# Patient Record
Sex: FEMALE | Race: WHITE | NOT HISPANIC OR LATINO | Employment: STUDENT | URBAN - METROPOLITAN AREA
[De-identification: names, ages, dates, MRNs, and addresses within clinical notes are randomized per-mention and may not be internally consistent; named-entity substitution may affect disease eponyms.]

---
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
## 2022-01-26 ENCOUNTER — VIRTUAL VISIT (OUTPATIENT)
Dept: PEDIATRICS | Facility: CLINIC | Age: 22
End: 2022-01-26
Payer: COMMERCIAL

## 2022-01-26 DIAGNOSIS — N94.6 DYSMENORRHEA: ICD-10-CM

## 2022-01-26 DIAGNOSIS — R48.0 DYSLEXIA: ICD-10-CM

## 2022-01-26 DIAGNOSIS — F90.0 ADHD, PREDOMINANTLY INATTENTIVE TYPE: Primary | ICD-10-CM

## 2022-01-26 PROBLEM — F43.23 ADJUSTMENT DISORDER WITH MIXED ANXIETY AND DEPRESSED MOOD: Status: RESOLVED | Noted: 2018-07-31 | Resolved: 2022-01-26

## 2022-01-26 PROCEDURE — 99214 OFFICE O/P EST MOD 30 MIN: CPT | Mod: TEL | Performed by: SPECIALIST

## 2022-01-26 RX ORDER — DEXTROAMPHETAMINE/AMPHETAMINE 15 MG
15 CAPSULE, EXT RELEASE 24 HR ORAL DAILY
Qty: 30 CAPSULE | Refills: 0 | Status: SHIPPED | OUTPATIENT
Start: 2022-02-26 | End: 2022-03-28

## 2022-01-26 RX ORDER — NORGESTIMATE AND ETHINYL ESTRADIOL 0.25-0.035
KIT ORAL
Qty: 84 TABLET | Refills: 3 | Status: SHIPPED | OUTPATIENT
Start: 2022-01-26 | End: 2022-12-16

## 2022-01-26 RX ORDER — DEXTROAMPHETAMINE/AMPHETAMINE 15 MG
15 CAPSULE, EXT RELEASE 24 HR ORAL DAILY
Qty: 30 CAPSULE | Refills: 0 | Status: SHIPPED | OUTPATIENT
Start: 2022-01-26 | End: 2022-02-25

## 2022-01-26 RX ORDER — DEXTROAMPHETAMINE/AMPHETAMINE 15 MG
15 CAPSULE, EXT RELEASE 24 HR ORAL DAILY
Qty: 30 CAPSULE | Refills: 0 | Status: SHIPPED | OUTPATIENT
Start: 2022-03-29 | End: 2022-04-28

## 2022-01-26 NOTE — PROGRESS NOTES
Ami is a 21 year old who is being evaluated via a billable telephone visit.      What phone number would you like to be contacted at? [phone]  How would you like to obtain your AVS? MyChart- will activate    Assessment & Plan   1. ADHD, predominantly inattentive type  Adderall is helping but sounds like more room for improvement.   Will increase to 15 mg.   If trouble with appetite or sleep to let us know.   Sent in 3 mos of scripts so when you run out of medication, contact pharmacy.  - ADDERALL XR 15 MG 24 hr capsule; Take 1 capsule (15 mg) by mouth daily Generic or brand name- which ever is covered  Dispense: 30 capsule; Refill: 0  - ADDERALL XR 15 MG 24 hr capsule; Take 1 capsule (15 mg) by mouth daily Generic or brand name- which ever is covered  Dispense: 30 capsule; Refill: 0  - ADDERALL XR 15 MG 24 hr capsule; Take 1 capsule (15 mg) by mouth daily Generic or brand name- which ever is covered  Dispense: 30 capsule; Refill: 0    2. Dyslexia    3. Dysmenorrhea  Doing well. If becomes sexually active then should have STD screening once yearly,   - norgestimate-ethinyl estradiol (ORTHO-CYCLEN) 0.25-35 MG-MCG tablet; Take by mouth active pills only as directed  Dispense: 84 tablet; Refill: 3- had refills here but sent to [place] instead.                      Return in about 6 months (around 7/26/2022) for Check up/ Well visit, ADHD, med recheck.    Laurie Guerrero MD  [facility]    Juan Mueller is a 21 year old who presents for the following health issues    HPI           Medication Followup of  Adderall and Birth Control pills  ADD recheck:     Taking Medication as prescribed: yes- only school days. Sees definite improvement- 30-40% improvement in symptoms. Open to going up on dosing. Lasts about 7- 8 hrs.     History of ADD but never took meds when younger. Had more difficulty in college and started Adderall 10/20.     Started College at [employer] very strict and not doing in  person- switched to [place] last year. In person school. Science major. Doing ok but school is hard.     Last office visit 11/24/21 Dr. Laura Vazquez- told to f/u 6 mos- thought she needed call today to get any refills.     Side Effects:  None; if takes in afternoon then fine.     Medication Helping Symptoms:  yes    PDMP Review       Value Time User    State PDMP site checked  Yes 11/24/2021  4:34 PM Laura Vazquez, April MD Hanna       Last filled 11/24/21     OCP- Ortho- cyclen  Has been on OCP since 6/18 for dysmenorrhea  Not sexually active but now has boyfriend. Was given estrogen cream to use by gyn. Nervous about pain.            Objective           Vitals:  No vitals were obtained today due to virtual visit.    Physical Exam   alert and no distress  PSYCH: Alert and oriented times 3; coherent speech, normal   rate and volume, able to articulate logical thoughts, able   to abstract reason, no tangential thoughts, no hallucinations   or delusions  Her affect is normal  Remainder of exam unable to be completed due to telephone visits              Phone call duration: 12  minutes

## 2022-01-26 NOTE — PATIENT INSTRUCTIONS
Will try increase in Adderall XR to 15 mg. If trouble with appetite or sleep to let us know.   Sent in 3 mos of scripts so when you run out of medication, contact pharmacy.    Refilled birth control pills.     Recommend recheck in 6 mos when home this summer- general check up, med follow up

## 2022-08-08 ENCOUNTER — OFFICE VISIT (OUTPATIENT)
Dept: INTERNAL MEDICINE | Facility: CLINIC | Age: 22
End: 2022-08-08
Payer: COMMERCIAL

## 2022-08-08 VITALS
WEIGHT: 128 LBS | SYSTOLIC BLOOD PRESSURE: 90 MMHG | HEART RATE: 104 BPM | RESPIRATION RATE: 16 BRPM | TEMPERATURE: 97.4 F | BODY MASS INDEX: 21.33 KG/M2 | DIASTOLIC BLOOD PRESSURE: 60 MMHG | OXYGEN SATURATION: 98 % | HEIGHT: 65 IN

## 2022-08-08 DIAGNOSIS — H93.93 EAR PROBLEM, BILATERAL: Primary | ICD-10-CM

## 2022-08-08 DIAGNOSIS — Z23 ENCOUNTER FOR ADMINISTRATION OF VACCINE: ICD-10-CM

## 2022-08-08 PROCEDURE — 99212 OFFICE O/P EST SF 10 MIN: CPT

## 2022-08-08 PROCEDURE — 0054A COVID-19,PF,PFIZER (12+ YRS): CPT

## 2022-08-08 PROCEDURE — 91305 COVID-19,PF,PFIZER (12+ YRS): CPT

## 2022-08-08 ASSESSMENT — ENCOUNTER SYMPTOMS
SORE THROAT: 0
FEVER: 0

## 2022-08-08 NOTE — PROGRESS NOTES
"  Assessment & Plan     (H93.93) Ear problem, bilateral  (primary encounter diagnosis)  Comment: Pt's mom was worried she has earwax buildup. Pt denies any ear pain or any changes in hearing. There is no wax buildup present upon exam. B/L ear exam normal.       (Z23) Encounter for administration of vaccine  Plan: COVID-19,PF,PFIZER (12+ Yrs GRAY LABEL)            10 minutes spent on the date of the encounter doing chart review, history and exam, documentation and further activities per the note  {Provider  Link to [facility] Help Grid :[id number]}       Return in about 1 month (around 9/8/2022) for Routine preventive.    Sangeeta Correia, BRIDGER CNP  [facility] Mueller is a 22 year old, presenting for the following health issues:  Ear Problem      HPI     Review of Systems   Constitutional: Negative for fever.   HENT: Negative for ear discharge, ear pain, hearing loss and sore throat.         Objective    BP 90/60   Pulse 104   Temp 97.4  F (36.3  C) (Oral)   Resp 16   Ht 1.651 m (5' 5\")   Wt 58.1 kg (128 lb)   LMP 07/13/2022 (Exact Date)   SpO2 98%   Breastfeeding No   BMI 21.30 kg/m    Body mass index is 21.3 kg/m .  Physical Exam  Constitutional:       General: She is not in acute distress.     Appearance: Normal appearance. She is not ill-appearing, toxic-appearing or diaphoretic.   HENT:      Head: Normocephalic and atraumatic.      Right Ear: Tympanic membrane, ear canal and external ear normal. There is no impacted cerumen.      Left Ear: Tympanic membrane, ear canal and external ear normal. There is no impacted cerumen.      Mouth/Throat:      Mouth: Mucous membranes are moist.      Pharynx: Oropharynx is clear. No oropharyngeal exudate or posterior oropharyngeal erythema.   Eyes:      Conjunctiva/sclera: Conjunctivae normal.   Cardiovascular:      Rate and Rhythm: Normal rate and regular rhythm.      Heart sounds: Normal heart sounds.   Pulmonary:      Effort: Pulmonary " effort is normal.      Breath sounds: Normal breath sounds.   Neurological:      Mental Status: She is alert.                .  ..

## 2022-11-14 ENCOUNTER — MYC REFILL (OUTPATIENT)
Dept: FAMILY MEDICINE | Facility: CLINIC | Age: 22
End: 2022-11-14

## 2022-11-14 DIAGNOSIS — F90.9 ATTENTION DEFICIT HYPERACTIVITY DISORDER (ADHD), UNSPECIFIED ADHD TYPE: ICD-10-CM

## 2022-11-15 RX ORDER — DEXTROAMPHETAMINE SACCHARATE, AMPHETAMINE ASPARTATE MONOHYDRATE, DEXTROAMPHETAMINE SULFATE AND AMPHETAMINE SULFATE 2.5; 2.5; 2.5; 2.5 MG/1; MG/1; MG/1; MG/1
10 CAPSULE, EXTENDED RELEASE ORAL DAILY
Qty: 30 CAPSULE | Refills: 0 | OUTPATIENT
Start: 2022-11-15

## 2022-11-15 NOTE — TELEPHONE ENCOUNTER
PDMP Review       Value Time User    State PDMP site checked  Yes 11/15/2022 10:40 AM Laurie Fulton MD          No refills on PDMP site.     1/26/22 LOV-      Return in about 6 months (around 7/26/2022) for Check up/ Well visit, ADHD, med recheck.    Declined refill. Needs appt.

## 2022-12-16 ENCOUNTER — VIRTUAL VISIT (OUTPATIENT)
Dept: FAMILY MEDICINE | Facility: CLINIC | Age: 22
End: 2022-12-16
Payer: COMMERCIAL

## 2022-12-16 DIAGNOSIS — F90.9 ATTENTION DEFICIT HYPERACTIVITY DISORDER (ADHD), UNSPECIFIED ADHD TYPE: ICD-10-CM

## 2022-12-16 DIAGNOSIS — N94.6 DYSMENORRHEA: ICD-10-CM

## 2022-12-16 PROCEDURE — 99214 OFFICE O/P EST MOD 30 MIN: CPT | Mod: TEL | Performed by: FAMILY MEDICINE

## 2022-12-16 RX ORDER — NORGESTIMATE AND ETHINYL ESTRADIOL 0.25-0.035
KIT ORAL
Qty: 112 TABLET | OUTPATIENT
Start: 2022-12-16

## 2022-12-16 RX ORDER — DEXTROAMPHETAMINE/AMPHETAMINE 15 MG
CAPSULE, EXT RELEASE 24 HR ORAL
COMMUNITY
Start: 2022-08-28 | End: 2022-12-16

## 2022-12-16 RX ORDER — NORGESTIMATE AND ETHINYL ESTRADIOL 0.25-0.035
KIT ORAL
Qty: 84 TABLET | Refills: 3 | Status: SHIPPED | OUTPATIENT
Start: 2022-12-16 | End: 2023-08-16

## 2022-12-16 RX ORDER — DEXTROAMPHETAMINE/AMPHETAMINE 15 MG
15 CAPSULE, EXT RELEASE 24 HR ORAL EVERY MORNING
Qty: 30 CAPSULE | Refills: 0 | Status: SHIPPED | OUTPATIENT
Start: 2022-12-16 | End: 2024-04-17

## 2022-12-16 RX ORDER — DEXTROAMPHETAMINE SACCHARATE, AMPHETAMINE ASPARTATE MONOHYDRATE, DEXTROAMPHETAMINE SULFATE AND AMPHETAMINE SULFATE 2.5; 2.5; 2.5; 2.5 MG/1; MG/1; MG/1; MG/1
10 CAPSULE, EXTENDED RELEASE ORAL DAILY
Qty: 30 CAPSULE | Refills: 0 | Status: SHIPPED | OUTPATIENT
Start: 2022-12-16 | End: 2022-12-16 | Stop reason: DRUGHIGH

## 2022-12-16 NOTE — TELEPHONE ENCOUNTER
Duplicate request. Prescription refilled today. Refusing refill request and closing encounter.     Frieda Crowell RN on 12/16/2022 at 10:39 AM

## 2022-12-16 NOTE — PROGRESS NOTES
Ami is a 22 year old who is being evaluated via a billable telephone visit.      What phone number would you like to be contacted at? [phone]    How would you like to obtain your AVS? Randy    Distant Location (provider location):  On-site    Assessment & Plan     Attention deficit hyperactivity disorder (ADHD), unspecified ADHD type  PDMP reviewed, no red flags. Working well, using on school days only.  Follow up in 6 months or sooner as needed.  -Adderall XR 15 mg 24 hr capsule, one capsule by mouth daily in the morning      Dysmenorrhea  Stable, continue current regimen.  Follow up in one year or sooner as needed.  - norgestimate-ethinyl estradiol (ORTHO-CYCLEN) 0.25-35 MG-MCG tablet; Take by mouth active pills only as directed      15 minutes spent on the date of the encounter doing chart review, history and exam, documentation and further activities per the note       See Patient Instructions    No follow-ups on file.    Dawna Vazquez MD  [facility]   Ami is a 22 year old, presenting for the following health issues:  No chief complaint on file.      HPI     Medication Followup of birth control    Taking Medication as prescribed: yes    Side Effects:  None    Medication Helping Symptoms:  Yes    Using both for painful periods and contraception, no concerns.    ADHD Follow-Up (Adult)  Concerns: none  Changes since last visit: Improving  Taking controlled (daily) medications as prescribed: Yes: only during school  Sleep: no problems  Adult ADHD Self-Reporting form given to patient?:  No  Currently in counseling: No    Medication Benefits:   Controlled symptoms: Attention span  Uncontrolled symptoms:  None    Medication Side Effects:  Reports:  none    Does Mon-Thursday during her school days, no problems with sleep or appetite.    Review of Systems   Constitutional, HEENT, cardiovascular, pulmonary, gi and gu systems are negative, except as otherwise  noted.      Objective           Vitals:  No vitals were obtained today due to virtual visit.    Physical Exam   healthy, alert and no distress  PSYCH: Alert and oriented times 3; coherent speech, normal   rate and volume, able to articulate logical thoughts, able   to abstract reason, no tangential thoughts, no hallucinations   or delusions  Her affect is normal  RESP: No cough, no audible wheezing, able to talk in full sentences  Remainder of exam unable to be completed due to telephone visits            Phone call duration: 7 minutes

## 2022-12-24 ENCOUNTER — HEALTH MAINTENANCE LETTER (OUTPATIENT)
Age: 22
End: 2022-12-24

## 2023-02-23 ENCOUNTER — TELEPHONE (OUTPATIENT)
Dept: FAMILY MEDICINE | Facility: CLINIC | Age: 23
End: 2023-02-23
Payer: COMMERCIAL

## 2023-02-23 NOTE — TELEPHONE ENCOUNTER
Patient Quality Outreach    Patient is due for the following:   Cervical Cancer Screening - PAP Needed  Physical Annual Wellness Visit  Chlamydia Screening      Topic Date Due     Flu Vaccine (1) 09/01/2022     COVID-19 Vaccine (5 - Booster for Pfizer series) 10/03/2022       Next Steps:   Schedule a Adult Preventative    Type of outreach:    Sent [employer] message.    Next Steps:  Reach out within 90 days via Letter.    Max number of attempts reached: No. Will try again in 90 days if patient still on fail list.    Questions for provider review:    None     Dana Louis CMA

## 2023-05-24 ENCOUNTER — TELEPHONE (OUTPATIENT)
Dept: FAMILY MEDICINE | Facility: CLINIC | Age: 23
End: 2023-05-24

## 2023-05-24 NOTE — TELEPHONE ENCOUNTER
Patient Quality Outreach    Patient is due for the following:   Cervical Cancer Screening - PAP Needed  Physical Preventive Adult Physical  Chlamydia Screening    Next Steps:   Schedule a Adult Preventative    Type of outreach:    Sent letter.    Next Steps:  Reach out within 90 days via Phone.    Max number of attempts reached: No. Will try again in 90 days if patient still on fail list.    Questions for provider review:    None           Dana Louis, CMA

## 2023-05-24 NOTE — LETTER
[place] [id number] [facility] [address]  [phone]  May 24, 2023    Ami Palencia  [address] [address] [facility] [address]    Dear Ami,    I care about your health and have reviewed your health plan. I have reviewed your medical conditions, medication list, and lab results and am making recommendations based on this review, to better manage your health.    You are in particular need of attention regarding:  -Cervical Cancer Screening  -Wellness (Physical) Visit   -Chlamydia Screening    I am recommending that you:  -schedule a WELLNESS (Physical) APPOINTMENT with me.   I will check fasting labs the same day - nothing to eat except water and meds for 8-10 hours prior. (If you go elsewhere for Wellness visits then please disregard this reminder.)    Here is a list of Health Maintenance topics that are due now or due soon:  Health Maintenance Due   Topic Date Due    CHLAMYDIA SCREENING  Never done    ADVANCE CARE PLANNING  Never done    YEARLY PREVENTIVE VISIT  11/06/2020    INFLUENZA VACCINE (1) 09/01/2022    COVID-19 Vaccine (5 - Pfizer series) 10/03/2022    PHQ-2 (once per calendar year)  01/01/2023    ANNUAL REVIEW OF HM ORDERS  01/26/2023    PAP  Never done       Please call us at [phone] (or use [email or web address]) to address the above recommendations.     Thank you for trusting [facility] and we appreciate the opportunity to serve you.  We look forward to supporting your healthcare needs in the future.    Healthy Regards,    April Coming-MD [place]

## 2023-08-09 SDOH — ECONOMIC STABILITY: FOOD INSECURITY: WITHIN THE PAST 12 MONTHS, YOU WORRIED THAT YOUR FOOD WOULD RUN OUT BEFORE YOU GOT MONEY TO BUY MORE.: NEVER TRUE

## 2023-08-09 SDOH — ECONOMIC STABILITY: INCOME INSECURITY: IN THE LAST 12 MONTHS, WAS THERE A TIME WHEN YOU WERE NOT ABLE TO PAY THE MORTGAGE OR RENT ON TIME?: NO

## 2023-08-09 SDOH — ECONOMIC STABILITY: TRANSPORTATION INSECURITY
IN THE PAST 12 MONTHS, HAS LACK OF TRANSPORTATION KEPT YOU FROM MEETINGS, WORK, OR FROM GETTING THINGS NEEDED FOR DAILY LIVING?: NO

## 2023-08-09 SDOH — ECONOMIC STABILITY: INCOME INSECURITY: HOW HARD IS IT FOR YOU TO PAY FOR THE VERY BASICS LIKE FOOD, HOUSING, MEDICAL CARE, AND HEATING?: NOT VERY HARD

## 2023-08-09 SDOH — HEALTH STABILITY: PHYSICAL HEALTH: ON AVERAGE, HOW MANY DAYS PER WEEK DO YOU ENGAGE IN MODERATE TO STRENUOUS EXERCISE (LIKE A BRISK WALK)?: 3 DAYS

## 2023-08-09 SDOH — ECONOMIC STABILITY: TRANSPORTATION INSECURITY
IN THE PAST 12 MONTHS, HAS THE LACK OF TRANSPORTATION KEPT YOU FROM MEDICAL APPOINTMENTS OR FROM GETTING MEDICATIONS?: NO

## 2023-08-09 SDOH — HEALTH STABILITY: PHYSICAL HEALTH: ON AVERAGE, HOW MANY MINUTES DO YOU ENGAGE IN EXERCISE AT THIS LEVEL?: 30 MIN

## 2023-08-09 SDOH — ECONOMIC STABILITY: FOOD INSECURITY: WITHIN THE PAST 12 MONTHS, THE FOOD YOU BOUGHT JUST DIDN'T LAST AND YOU DIDN'T HAVE MONEY TO GET MORE.: NEVER TRUE

## 2023-08-09 ASSESSMENT — ENCOUNTER SYMPTOMS
DIZZINESS: 0
HEARTBURN: 0
HEMATURIA: 0
HEMATOCHEZIA: 0
WEAKNESS: 0
COUGH: 0
SHORTNESS OF BREATH: 0
CHILLS: 0
ARTHRALGIAS: 0
DIARRHEA: 0
DYSURIA: 0
CONSTIPATION: 0
PARESTHESIAS: 0
BREAST MASS: 0
JOINT SWELLING: 0
FEVER: 0
NAUSEA: 0
ABDOMINAL PAIN: 0
NERVOUS/ANXIOUS: 0
MYALGIAS: 0
HEADACHES: 0
SORE THROAT: 0
FREQUENCY: 0
PALPITATIONS: 0
EYE PAIN: 0

## 2023-08-09 ASSESSMENT — LIFESTYLE VARIABLES
HOW MANY STANDARD DRINKS CONTAINING ALCOHOL DO YOU HAVE ON A TYPICAL DAY: 3 OR 4
HOW OFTEN DO YOU HAVE SIX OR MORE DRINKS ON ONE OCCASION: NEVER
HOW OFTEN DO YOU HAVE A DRINK CONTAINING ALCOHOL: 2-4 TIMES A MONTH
AUDIT-C TOTAL SCORE: 3
SKIP TO QUESTIONS 9-10: 0

## 2023-08-09 ASSESSMENT — SOCIAL DETERMINANTS OF HEALTH (SDOH)
HOW OFTEN DO YOU ATTEND CHURCH OR RELIGIOUS SERVICES?: 1 TO 4 TIMES PER YEAR
ARE YOU MARRIED, WIDOWED, DIVORCED, SEPARATED, NEVER MARRIED, OR LIVING WITH A PARTNER?: NEVER MARRIED
DO YOU BELONG TO ANY CLUBS OR ORGANIZATIONS SUCH AS CHURCH GROUPS UNIONS, FRATERNAL OR ATHLETIC GROUPS, OR SCHOOL GROUPS?: YES
IN A TYPICAL WEEK, HOW MANY TIMES DO YOU TALK ON THE PHONE WITH FAMILY, FRIENDS, OR NEIGHBORS?: MORE THAN THREE TIMES A WEEK
HOW OFTEN DO YOU GET TOGETHER WITH FRIENDS OR RELATIVES?: ONCE A WEEK

## 2023-08-16 ENCOUNTER — OFFICE VISIT (OUTPATIENT)
Dept: FAMILY MEDICINE | Facility: CLINIC | Age: 23
End: 2023-08-16
Payer: COMMERCIAL

## 2023-08-16 VITALS
TEMPERATURE: 97.3 F | HEART RATE: 77 BPM | OXYGEN SATURATION: 99 % | BODY MASS INDEX: 20.99 KG/M2 | HEIGHT: 65 IN | WEIGHT: 126 LBS | SYSTOLIC BLOOD PRESSURE: 97 MMHG | DIASTOLIC BLOOD PRESSURE: 72 MMHG | RESPIRATION RATE: 14 BRPM

## 2023-08-16 DIAGNOSIS — N94.6 DYSMENORRHEA: Primary | ICD-10-CM

## 2023-08-16 DIAGNOSIS — Z00.00 ROUTINE GENERAL MEDICAL EXAMINATION AT A HEALTH CARE FACILITY: ICD-10-CM

## 2023-08-16 DIAGNOSIS — F90.9 ATTENTION DEFICIT HYPERACTIVITY DISORDER (ADHD), UNSPECIFIED ADHD TYPE: ICD-10-CM

## 2023-08-16 DIAGNOSIS — F40.232 FEAR ASSOCIATED WITH HEALTHCARE: ICD-10-CM

## 2023-08-16 PROCEDURE — 99213 OFFICE O/P EST LOW 20 MIN: CPT | Mod: 25 | Performed by: PHYSICIAN ASSISTANT

## 2023-08-16 PROCEDURE — 99395 PREV VISIT EST AGE 18-39: CPT | Performed by: PHYSICIAN ASSISTANT

## 2023-08-16 RX ORDER — DEXTROAMPHETAMINE SACCHARATE, AMPHETAMINE ASPARTATE MONOHYDRATE, DEXTROAMPHETAMINE SULFATE AND AMPHETAMINE SULFATE 3.75; 3.75; 3.75; 3.75 MG/1; MG/1; MG/1; MG/1
15 CAPSULE, EXTENDED RELEASE ORAL DAILY
Qty: 30 CAPSULE | Refills: 0 | Status: SHIPPED | OUTPATIENT
Start: 2023-08-16 | End: 2023-09-15

## 2023-08-16 RX ORDER — LORAZEPAM 0.5 MG/1
0.5 TABLET ORAL EVERY 6 HOURS PRN
Qty: 4 TABLET | Refills: 0 | Status: SHIPPED | OUTPATIENT
Start: 2023-08-16 | End: 2024-04-17

## 2023-08-16 RX ORDER — DEXTROAMPHETAMINE/AMPHETAMINE 15 MG
15 CAPSULE, EXT RELEASE 24 HR ORAL EVERY MORNING
Qty: 30 CAPSULE | Refills: 0 | Status: CANCELLED | OUTPATIENT
Start: 2023-08-16

## 2023-08-16 RX ORDER — DEXTROAMPHETAMINE SACCHARATE, AMPHETAMINE ASPARTATE MONOHYDRATE, DEXTROAMPHETAMINE SULFATE AND AMPHETAMINE SULFATE 3.75; 3.75; 3.75; 3.75 MG/1; MG/1; MG/1; MG/1
15 CAPSULE, EXTENDED RELEASE ORAL DAILY
Qty: 30 CAPSULE | Refills: 0 | Status: SHIPPED | OUTPATIENT
Start: 2023-09-16 | End: 2023-10-16

## 2023-08-16 RX ORDER — DEXTROAMPHETAMINE SACCHARATE, AMPHETAMINE ASPARTATE MONOHYDRATE, DEXTROAMPHETAMINE SULFATE AND AMPHETAMINE SULFATE 3.75; 3.75; 3.75; 3.75 MG/1; MG/1; MG/1; MG/1
15 CAPSULE, EXTENDED RELEASE ORAL DAILY
Qty: 30 CAPSULE | Refills: 0 | Status: SHIPPED | OUTPATIENT
Start: 2023-10-17 | End: 2023-11-16

## 2023-08-16 RX ORDER — NORGESTIMATE AND ETHINYL ESTRADIOL 0.25-0.035
KIT ORAL
Qty: 84 TABLET | Refills: 3 | Status: SHIPPED | OUTPATIENT
Start: 2023-08-16 | End: 2024-04-17

## 2023-08-16 ASSESSMENT — ENCOUNTER SYMPTOMS
CHILLS: 0
DYSURIA: 0
SHORTNESS OF BREATH: 0
FREQUENCY: 0
HEMATURIA: 0
COUGH: 0
NERVOUS/ANXIOUS: 0
FEVER: 0
SORE THROAT: 0
MYALGIAS: 0
HEARTBURN: 0
EYE PAIN: 0
JOINT SWELLING: 0
HEADACHES: 0
ARTHRALGIAS: 0
DIARRHEA: 0
BREAST MASS: 0
PARESTHESIAS: 0
DIZZINESS: 0
ABDOMINAL PAIN: 0
HEMATOCHEZIA: 0
PALPITATIONS: 0
WEAKNESS: 0
NAUSEA: 0
CONSTIPATION: 0

## 2023-08-16 NOTE — PATIENT INSTRUCTIONS
Preventive Health Recommendations  Female Ages 21 to 25     Yearly exam:   See your health care provider every year in order to  Review health changes.   Discuss preventive care.    Review your medicines if your doctor has prescribed any.    You should be tested each year for STDs (sexually transmitted diseases).     Talk to your provider about how often you should have cholesterol testing.    Get a Pap test every three years. If you have an abnormal result, your doctor may have you test more often.    If you are at risk for diabetes, you should have a diabetes test (fasting glucose).     Shots:   Get a flu shot each year.   Get a tetanus shot every 10 years.   Consider getting the shot (vaccine) that prevents cervical cancer (Gardasil).    Nutrition:   Eat at least 5 servings of fruits and vegetables each day.  Eat whole-grain bread, whole-wheat pasta and brown rice instead of white grains and rice.  Get adequate Calcium and Vitamin D.     Lifestyle  Exercise at least 150 minutes a week each week (30 minutes a day, 5 days a week). This will help you control your weight and prevent disease.  Limit alcohol to one drink per day.  No smoking.   Wear sunscreen to prevent skin cancer.  See your dentist every six months for an exam and cleaning.  Preventive Health Recommendations  Female Ages 21 to 25     Yearly exam:   See your health care provider every year in order to  Review health changes.   Discuss preventive care.    Review your medicines if your doctor has prescribed any.    You should be tested each year for STDs (sexually transmitted diseases).     Talk to your provider about how often you should have cholesterol testing.    Get a Pap test every three years. If you have an abnormal result, your doctor may have you test more often.    If you are at risk for diabetes, you should have a diabetes test (fasting glucose).     Shots:   Get a flu shot each year.   Get a tetanus shot every 10 years.   Consider getting  the shot (vaccine) that prevents cervical cancer (Gardasil).    Nutrition:   Eat at least 5 servings of fruits and vegetables each day.  Eat whole-grain bread, whole-wheat pasta and brown rice instead of white grains and rice.  Get adequate Calcium and Vitamin D.     Lifestyle  Exercise at least 150 minutes a week each week (30 minutes a day, 5 days a week). This will help you control your weight and prevent disease.  Limit alcohol to one drink per day.  No smoking.   Wear sunscreen to prevent skin cancer.  See your dentist every six months for an exam and cleaning.

## 2023-08-16 NOTE — PROGRESS NOTES
SUBJECTIVE:   CC: Ami is an 23 year old who presents for preventive health visit.       Healthy Habits:     Getting at least 3 servings of Calcium per day:  Yes    Bi-annual eye exam:  Yes    Dental care twice a year:  Yes    Sleep apnea or symptoms of sleep apnea:  None    Diet:  Regular (no restrictions)    Frequency of exercise:  1 day/week    Duration of exercise:  15-30 minutes    Taking medications regularly:  Yes    Medication side effects:  Not applicable    Additional concerns today:  No      Today's PHQ-2 Score:       8/16/2023     5:12 PM   PHQ-2 ( 1999 [employer])   Q1: Little interest or pleasure in doing things 0   Q2: Feeling down, depressed or hopeless 0   PHQ-2 Score 0   Q1: Little interest or pleasure in doing things Not at all   Q2: Feeling down, depressed or hopeless Not at all   PHQ-2 Score 0         Have you ever done Advance Care Planning? (For example, a Health Directive, POLST, or a discussion with a medical provider or your loved ones about your wishes): No, advance care planning information given to patient to review.  Patient declined advance care planning discussion at this time.    Social History     Tobacco Use    Smoking status: Never    Smokeless tobacco: Never    Tobacco comments:     non smoking home   Substance Use Topics    Alcohol use: No     Alcohol/week: 0.0 standard drinks of alcohol             8/9/2023    10:04 PM   Alcohol Use   Prescreen: >3 drinks/day or >7 drinks/week? No     Reviewed orders with patient.  Reviewed health maintenance and updated orders accordingly - Yes  BP Readings from Last 3 Encounters:   08/16/23 97/72   08/08/22 90/60   11/24/21 98/64    Wt Readings from Last 3 Encounters:   08/16/23 57.2 kg (126 lb)   08/08/22 58.1 kg (128 lb)   11/24/21 60.8 kg (134 lb) (60 %, Z= 0.26)*     * Growth percentiles are based on CDC (Girls, 2-20 Years) data.                  Patient Active Problem List   Diagnosis    Seasonal allergies    ADHD, predominantly  inattentive type    Dyslexia    Idiopathic scoliosis    Scheuermann's kyphosis    Dysmenorrhea    Sleep disturbance    Vulvodynia    Vaginospasm    History of depression    History of anxiety state     Past Surgical History:   Procedure Laterality Date    NO HISTORY OF SURGERY         Social History     Tobacco Use    Smoking status: Never    Smokeless tobacco: Never    Tobacco comments:     non smoking home   Substance Use Topics    Alcohol use: No     Alcohol/week: 0.0 standard drinks of alcohol     Family History   Problem Relation Age of Onset    No Known Problems Mother     No Known Problems Father     Asthma Brother     Allergies Brother     C.A.D. Maternal Grandmother         DICKSON QUICKACHRISTINE. Paternal Grandfather         MI         Current Outpatient Medications   Medication Sig Dispense Refill    ADDERALL XR 15 MG 24 hr capsule Take 1 capsule (15 mg) by mouth every morning 30 capsule 0    amphetamine-dextroamphetamine (ADDERALL XR) 15 MG 24 hr capsule Take 1 capsule (15 mg) by mouth daily for 30 days 30 capsule 0    [START ON 9/16/2023] amphetamine-dextroamphetamine (ADDERALL XR) 15 MG 24 hr capsule Take 1 capsule (15 mg) by mouth daily for 30 days 30 capsule 0    [START ON 10/17/2023] amphetamine-dextroamphetamine (ADDERALL XR) 15 MG 24 hr capsule Take 1 capsule (15 mg) by mouth daily for 30 days 30 capsule 0    LORazepam (ATIVAN) 0.5 MG tablet Take 1 tablet (0.5 mg) by mouth every 6 hours as needed for anxiety 4 tablet 0    norgestimate-ethinyl estradiol (ORTHO-CYCLEN) 0.25-35 MG-MCG tablet Take by mouth active pills only as directed 84 tablet 3     Allergies   Allergen Reactions    Penicillins Hives    Seasonal Allergies     Amoxicillin Hives and Diarrhea     Recent Labs   Lab Test 12/11/18  1808   TSH 0.79        Breast Cancer Screening:        History of abnormal Pap smear: NO - age 21-29 PAP every 3 years recommended   Declined pap       Reviewed and updated as needed this visit by clinical staff   Tobacco  " Allergies  Meds              Reviewed and updated as needed this visit by Provider                     Review of Systems   Constitutional:  Negative for chills and fever.   HENT:  Negative for congestion, ear pain, hearing loss and sore throat.    Eyes:  Negative for pain and visual disturbance.   Respiratory:  Negative for cough and shortness of breath.    Cardiovascular:  Negative for chest pain, palpitations and peripheral edema.   Gastrointestinal:  Negative for abdominal pain, constipation, diarrhea, heartburn, hematochezia and nausea.   Breasts:  Negative for tenderness, breast mass and discharge.   Genitourinary:  Positive for vaginal discharge. Negative for dysuria, frequency, genital sores, hematuria, pelvic pain, urgency and vaginal bleeding.   Musculoskeletal:  Negative for arthralgias, joint swelling and myalgias.   Skin:  Negative for rash.   Neurological:  Negative for dizziness, weakness, headaches and paresthesias.   Psychiatric/Behavioral:  Negative for mood changes. The patient is not nervous/anxious.           OBJECTIVE:   BP 97/72 (BP Location: Right arm, Patient Position: Chair, Cuff Size: Adult Regular)   Pulse 77   Temp 97.3  F (36.3  C) (Oral)   Resp 14   Ht 1.657 m (5' 5.25\")   Wt 57.2 kg (126 lb)   LMP 07/25/2023 (Approximate)   SpO2 99%   Breastfeeding No   BMI 20.81 kg/m    Physical Exam  GENERAL: healthy, alert and no distress  EYES: Eyes grossly normal to inspection, PERRL and conjunctivae and sclerae normal  HENT: ear canals and TM's normal, nose and mouth without ulcers or lesions  NECK: no adenopathy, no asymmetry, masses, or scars and thyroid normal to palpation  RESP: lungs clear to auscultation - no rales, rhonchi or wheezes  BREAST: normal without masses, tenderness or nipple discharge and no palpable axillary masses or adenopathy  CV: regular rate and rhythm, normal S1 S2, no S3 or S4, no murmur, click or rub, no peripheral edema and peripheral pulses " strong  ABDOMEN: soft, nontender, no hepatosplenomegaly, no masses and bowel sounds normal  MS: no gross musculoskeletal defects noted, no edema  SKIN: no suspicious lesions or rashes  NEURO: Normal strength and tone, mentation intact and speech normal  PSYCH: mentation appears normal, affect normal/bright    Diagnostic Test Results:  Labs reviewed in Epic    ASSESSMENT/PLAN:       (N94.6) Dysmenorrhea  Comment:   Plan: norgestimate-ethinyl estradiol (ORTHO-CYCLEN)         0.25-35 MG-MCG tablet            (F90.9) Attention deficit hyperactivity disorder (ADHD), unspecified ADHD type  Comment: works well only take during school year.   Plan: amphetamine-dextroamphetamine (ADDERALL XR) 15         MG 24 hr capsule, amphetamine-dextroamphetamine        (ADDERALL XR) 15 MG 24 hr capsule,         amphetamine-dextroamphetamine (ADDERALL XR) 15         MG 24 hr capsule                (Z00.00) Routine general medical examination at a health care facility  Comment:   Plan:     (F40.232) Fear associated with healthcare  Comment: will get pap in [place] but diagnosed with vulvodynia at age 19 and very nervous about it.  Agreed to provide ativan before exam and advised she will need to have someone drive her to appt.   Plan: LORazepam (ATIVAN) 0.5 MG tablet                  COUNSELING:  Reviewed preventive health counseling, as reflected in patient instructions       Regular exercise       Healthy diet/nutrition       Vision screening       Hearing screening        She reports that she has never smoked. She has never used smokeless tobacco.          Ramona Ann Aaseby-Aguilera, PA-C  [facility]

## 2023-08-17 DIAGNOSIS — N94.6 DYSMENORRHEA: ICD-10-CM

## 2023-08-17 RX ORDER — NORGESTIMATE AND ETHINYL ESTRADIOL 0.25-0.035
KIT ORAL
Qty: 112 TABLET | OUTPATIENT
Start: 2023-08-17

## 2023-08-29 ENCOUNTER — PATIENT OUTREACH (OUTPATIENT)
Dept: FAMILY MEDICINE | Facility: CLINIC | Age: 23
End: 2023-08-29
Payer: COMMERCIAL

## 2023-08-29 NOTE — TELEPHONE ENCOUNTER
Patient Quality Outreach    Patient is due for the following:   Physical Preventive Adult Physical    Next Steps:   No follow up needed at this time.    Type of outreach:    Chart review performed, no outreach needed.    Questions for provider review:    None           Dana Louis CMA

## 2023-10-23 NOTE — PROGRESS NOTES
Progress Note    Client Name: Ami Palencia  Date: 6/20/2019           Service Type: Individual  Video Visit: No     Session Start Time: 10:00am  Session End Time: 10:45am     Session Length: 45 mins     Session #: 1    Attendees: Client attended alone     Treatment Plan Last Reviewed: to be completed by session 3  PHQ-9 / CHELI-7 : NA this session     DATA  Interactive Complexity: No  Crisis: No       Progress Since Last Session (Related to Symptoms / Goals / Homework):   Symptoms: No change last session was DA    Homework: Last session DA      Episode of Care Goals: No goals yet created     Current / Ongoing Stressors and Concerns:   Relationships, peers, academics      Treatment Objective(s) Addressed in This Session:   past and current relationships   CBT triangle      Intervention:   CBT: thoughts>feelings>behaviors  Emotion Focused Therapy: processing relationships        ASSESSMENT: Current Emotional / Mental Status (status of significant symptoms):   Risk status (Self / Other harm or suicidal ideation)   Client denies current fears or concerns for personal safety.   Client denies current or recent suicidal ideation or behaviors.   Client denies current or recent homicidal ideation or behaviors.   Client denies current or recent self injurious behavior or ideation.   Client denies other safety concerns.   Client Client reports there has been no change in risk factors since their last session.     Client Client reports there has been no change in protective factors since their last session.     A safety and risk management plan has not been developed at this time, however client was given the after-hours number / 911 should there be a change in any of these risk factors.     Appearance:   Appropriate    Eye Contact:   Good    Psychomotor Behavior: Normal    Attitude:   Cooperative    Orientation:   All   Speech    Rate / Production: Normal     Volume:  Normal  Occupational Therapy Visit    Visit Type: Daily Treatment Note  Visit: 7  Referring Provider: Mike Siddiqui MD  Medical Diagnosis (from order): Diagnosis Information    Diagnosis  M77.11 (ICD-10-CM) - Lateral epicondylitis of right elbow         SUBJECTIVE                                                                                                               Patient relays 3/10 pain with movement prior to treatment. Relays decreased pain with elbow extension.      OBJECTIVE                                                                                                                    Observation   Patient needed cueing throughout treatment for correctness with therapeutic exercises.  Lateral epicondyle, forearm extensor wad tender to palpation  HRJ tight and tender to palpation         Palpation  Right  - Common Extensor Tendon: tenderness and hypertonic  Elbow/Forearm: [place]/Tendon/Bone  - Lateral Epicondyle:  - Right: tenderness                  Treatment    Ultrasound (02255)  - Location: right lateral elbow and extensor wad of forearm  - Position: sitting  - Duty Cycle: 100%  - Frequency: 3 Mhz  - Intensity (w/cm2): 1.3  - Duration: 8 minutes    Results: decreased pain, improved range of motion, improved circulation and tissue softening  Reaction: no adverse reaction to treatment      Therapeutic Exercise  UBE 5 minutes in reverse to facilitate increased range of motion, decrease stiffness, increase circulation, and increase tissue/joint mobility.     Manual Therapy   HRJ mobilization with distraction and medial glides to facilitate decreased tightness and improve elbow extension    Performed soft tissue mobilization to forearm extensor wad to promote tissue healing, reduce/break down adhesions, improve range of motion, lengthen tissues and tendons, reduce swelling and edema, improve tissue extensibility, restore functionality and improve pain.    Transverse friction mobilization to ECRL/B tendon     Mood:    Anxious  Normal   Affect:    Appropriate  Bright    Thought Content:  Clear    Thought Form:  Coherent  Logical    Insight:    Fair      Medication Review:   No current psychiatric medications prescribed     Medication Compliance:   NA     Changes in Health Issues:   None reported     Chemical Use Review:   Substance Use: Chemical use reviewed, no active concerns identified      Tobacco Use: No current tobacco use.      Diagnosis:  1. CHELI (generalized anxiety disorder)    2. Current mild episode of major depressive disorder without prior episode (H)        Collateral Reports Completed:   Not Applicable    PLAN: (Client Tasks / Therapist Tasks / Other)  Provider used session for further information gathering about client  Client shared she fears she has commitment issues as her friends have said so   Provider reassured client that being young it is okay to not be committed to one relationship for a long period of time   Client reported her main stressors to be peers, relationships and school/work  Provider educated client on CBT triangle   Provider assigned client to think about goals for next session and document any topics she may want to discuss         Telma Brady, [facility] 6/20/2019                                                            attachment to reduce pain, improve blood flow and stimulate collagen reorganization.     IASTM to the extensor wad and tendon insertion at lateral epicondyle to facilitate increased tissue mobility, decrease fascial restrictions, and to modulate pain.    Myofascial cupping to the extensor wad with external and internal glide techniques to facilitate reduction of pain, restore optimal motion and function.     Skilled input: verbal instruction/cues and tactile instruction/cues    Writer verbally educated and received verbal consent for hand placement, positioning of patient, and techniques to be performed today from patient for hand placement and palpation for techniques as described above and how they are pertinent to the patient's plan of care.  Home Exercise Program  Access Code: [email or web address]    Exercises  - Standing Wrist Extensor Stretch with Arm Bent  - 3 x daily - 7 x weekly - 1 sets - 3 reps - 30s hold  - Standing Wrist Flexor Stretch with Arm Bent  -  - 30s hold    Rhythmic punches - 3 x daily - 7 x weekly - 2 sets - 10 reps      ASSESSMENT                                                                                                            Patient relays good tolerance to session tasks with decreased pain following session. Treatment is currently focused on promoting healing, enhancing collagen organization, moderating pain, and stimulating joint mechanoreceptors. Extra time spent on manual therapy to decrease pain and tightness and increase circulation to injured tissues. HRJ mobilizations to improve elbow extension with decreased pain. IASTM and cupping utilized to promote fibroblast proliferation decreased fascial restrictions and improve muscle function. Patient would benefit from continued skilled occupational therapy to increase functional performance in ADLs/IADLs.         Pain/symptoms after session (out of 10): 3 and 2  Education:   - Results of above outlined education: Verbalizes  understanding    PLAN                                                                                                                           Suggestions for next session as indicated: Progress per plan of care        Therapy procedure time and total treatment time can be found documented on the Time Entry flowsheet

## 2023-11-28 ENCOUNTER — TELEPHONE (OUTPATIENT)
Dept: FAMILY MEDICINE | Facility: CLINIC | Age: 23
End: 2023-11-28
Payer: COMMERCIAL

## 2023-11-28 NOTE — TELEPHONE ENCOUNTER
Summary:    Patient is due/failing the following:   Chlamydia screening    Reviewed:    [] CARE EVERYWHERE  [] LAST OV NOTE   [] FYI TAB  [] MYCHART ACTIVE?  [] LAST PANEL ENCOUNTER  [] FUTURE APPTS  [] IMMUNIZATIONS  [] Media Tab            Action needed:   Patient needs non-fasting lab only appointment    Type of outreach:    Sent [employer] message.                                                                               Waleska Redding/CHUNG  [place]---[facility]

## 2023-12-26 ENCOUNTER — OFFICE VISIT (OUTPATIENT)
Dept: PEDIATRICS | Facility: CLINIC | Age: 23
End: 2023-12-26
Payer: COMMERCIAL

## 2023-12-26 VITALS
SYSTOLIC BLOOD PRESSURE: 102 MMHG | WEIGHT: 125.6 LBS | TEMPERATURE: 97.5 F | DIASTOLIC BLOOD PRESSURE: 60 MMHG | BODY MASS INDEX: 20.74 KG/M2 | HEART RATE: 65 BPM | RESPIRATION RATE: 16 BRPM | OXYGEN SATURATION: 99 %

## 2023-12-26 DIAGNOSIS — J01.10 ACUTE NON-RECURRENT FRONTAL SINUSITIS: ICD-10-CM

## 2023-12-26 DIAGNOSIS — Z12.4 CERVICAL CANCER SCREENING: ICD-10-CM

## 2023-12-26 DIAGNOSIS — Z11.3 SCREENING FOR STDS (SEXUALLY TRANSMITTED DISEASES): Primary | ICD-10-CM

## 2023-12-26 PROCEDURE — 99214 OFFICE O/P EST MOD 30 MIN: CPT | Performed by: INTERNAL MEDICINE

## 2023-12-26 RX ORDER — AZITHROMYCIN 250 MG/1
TABLET, FILM COATED ORAL
Qty: 6 TABLET | Refills: 0 | Status: SHIPPED | OUTPATIENT
Start: 2023-12-26 | End: 2023-12-31

## 2023-12-26 NOTE — PROGRESS NOTES
"  Assessment & Plan       Acute non-recurrent frontal sinusitis  \  Symptoms going on > 2 weeks now, with upcoming plane travel.      Patient Instructions   Saline spray (nonmedicated salt water) in small squirt bottles can be used every hour or two during the day, as can humidifiers during the night.  Steam showers can help keep mucous loose.       For adults and kids over 6, expectorants (like \"Mucinex\" or \"Robitussin\") may help, as can using cough supressants (like the \"DM\" in Mucinex DM and Robitussin DM).    Might benefit from antihistamines like Zyrtec (cetirizine) for relief of congestion; the dose is usually 10 mg for adults.    3 days at a time total for Afrin nasal spray.    If not improved fill prescription for azithromycin for 5 days.    Jamie Randolph MD  Internal Medicine and Pediatrics            - azithromycin (ZITHROMAX) 250 MG tablet; Take 2 tablets (500 mg) by mouth daily for 1 day, THEN 1 tablet (250 mg) daily for 4 days.             See Patient Instructions    Jamie Randolph MD  [facility] GILBERTO Mueller is a 23 year old, presenting for the following health issues:  Sinus Problem        12/26/2023     2:19 PM   Additional Questions   Roomed by Soledad Madsion CMA     Began 2 weeks ago;  green nasal discharge, pressure in sinuses, headaches.  Frontal sinuses.  No fevers.  No coughing or congestion.   Not worsening, just steady.      Has tried just over-the-counter ibuprofen and rest.  Uses a humidifier.      No other sick contacts.  No testing for COVID or flu.  Did have COVID and flu on 11/21/23.    Going on vacation to [place] in 4 days.     Sleeping okay.       Sinus Problem     History of Present Illness       Reason for visit:  Sinus infection  Symptom onset:  1-2 weeks ago  Symptoms include:  Green nasal discharge and sinus pressure  Symptom intensity:  Moderate  Symptom progression:  Staying the same  Had these symptoms before:  Yes  Has tried/received treatment for " these symptoms:  Yes  Previous treatment was successful:  Yes  Prior treatment description:  Antibotics  What makes it worse:  No  What makes it better:  No    She eats 2-3 servings of fruits and vegetables daily.She consumes 1 sweetened beverage(s) daily.She exercises with enough effort to increase her heart rate 30 to 60 minutes per day.  She exercises with enough effort to increase her heart rate 7 days per week.   She is taking medications regularly.                 Review of Systems         Objective    /60 (BP Location: Right arm, Patient Position: Sitting, Cuff Size: Adult Regular)   Pulse 65   Temp 97.5  F (36.4  C) (Tympanic)   Resp 16   Wt 57 kg (125 lb 9.6 oz)   SpO2 99%   BMI 20.74 kg/m    Body mass index is 20.74 kg/m .  Physical Exam   GENERAL: healthy, alert and no distress  EYES: Eyes grossly normal to inspection, PERRL and conjunctivae and sclerae normal  HENT: ear canals and TM's normal, nose and mouth without ulcers or lesions  NECK: no adenopathy, no asymmetry, masses, or scars and thyroid normal to palpation  RESP: lungs clear to auscultation - no rales, rhonchi or wheezes  CV: regular rate and rhythm, normal S1 S2, no S3 or S4, no murmur, click or rub, no peripheral edema and peripheral pulses strong  MS: no gross musculoskeletal defects noted, no edema  SKIN: no suspicious lesions or rashes  NEURO: Normal strength and tone, mentation intact and speech normal  PSYCH: mentation appears normal, affect normal/bright

## 2023-12-26 NOTE — PATIENT INSTRUCTIONS
"Saline spray (nonmedicated salt water) in small squirt bottles can be used every hour or two during the day, as can humidifiers during the night.  Steam showers can help keep mucous loose.       For adults and kids over 6, expectorants (like \"Mucinex\" or \"Robitussin\") may help, as can using cough supressants (like the \"DM\" in Mucinex DM and Robitussin DM).    Might benefit from antihistamines like Zyrtec (cetirizine) for relief of congestion; the dose is usually 10 mg for adults.    3 days at a time total for Afrin nasal spray.    If not improved fill prescription for azithromycin for 5 days.    Jamie Randolph MD  Internal Medicine and Pediatrics           "

## 2024-04-17 ENCOUNTER — OFFICE VISIT (OUTPATIENT)
Dept: FAMILY MEDICINE | Facility: CLINIC | Age: 24
End: 2024-04-17
Payer: COMMERCIAL

## 2024-04-17 VITALS
OXYGEN SATURATION: 98 % | DIASTOLIC BLOOD PRESSURE: 61 MMHG | SYSTOLIC BLOOD PRESSURE: 94 MMHG | HEIGHT: 65 IN | WEIGHT: 128.2 LBS | RESPIRATION RATE: 16 BRPM | BODY MASS INDEX: 21.36 KG/M2 | TEMPERATURE: 97.4 F | HEART RATE: 82 BPM

## 2024-04-17 DIAGNOSIS — Z11.3 SCREENING FOR STDS (SEXUALLY TRANSMITTED DISEASES): ICD-10-CM

## 2024-04-17 DIAGNOSIS — N94.6 DYSMENORRHEA: ICD-10-CM

## 2024-04-17 DIAGNOSIS — Z23 NEED FOR VACCINATION: ICD-10-CM

## 2024-04-17 DIAGNOSIS — Z11.1 SCREENING FOR TUBERCULOSIS: ICD-10-CM

## 2024-04-17 DIAGNOSIS — F90.9 ATTENTION DEFICIT HYPERACTIVITY DISORDER (ADHD), UNSPECIFIED ADHD TYPE: Primary | ICD-10-CM

## 2024-04-17 PROCEDURE — 99214 OFFICE O/P EST MOD 30 MIN: CPT | Mod: 25 | Performed by: FAMILY MEDICINE

## 2024-04-17 PROCEDURE — 90715 TDAP VACCINE 7 YRS/> IM: CPT | Performed by: FAMILY MEDICINE

## 2024-04-17 PROCEDURE — 90471 IMMUNIZATION ADMIN: CPT | Performed by: FAMILY MEDICINE

## 2024-04-17 PROCEDURE — 86481 TB AG RESPONSE T-CELL SUSP: CPT | Performed by: FAMILY MEDICINE

## 2024-04-17 PROCEDURE — 86780 TREPONEMA PALLIDUM: CPT | Performed by: FAMILY MEDICINE

## 2024-04-17 PROCEDURE — 36415 COLL VENOUS BLD VENIPUNCTURE: CPT | Performed by: FAMILY MEDICINE

## 2024-04-17 RX ORDER — DEXTROAMPHETAMINE/AMPHETAMINE 15 MG
15 CAPSULE, EXT RELEASE 24 HR ORAL EVERY MORNING
Qty: 120 CAPSULE | Refills: 0 | Status: SHIPPED | OUTPATIENT
Start: 2024-04-17 | End: 2024-04-25

## 2024-04-17 RX ORDER — NORGESTIMATE AND ETHINYL ESTRADIOL 0.25-0.035
KIT ORAL
Qty: 168 TABLET | Refills: 1 | Status: SHIPPED | OUTPATIENT
Start: 2024-04-17

## 2024-04-17 ASSESSMENT — PAIN SCALES - GENERAL: PAINLEVEL: NO PAIN (0)

## 2024-04-17 NOTE — PROGRESS NOTES
Assessment & Plan     Attention deficit hyperactivity disorder (ADHD), unspecified ADHD type  Will order Rx for 120 days, if not approved patient will let us know and a different quantity can be dispensed.  - ADDERALL XR 15 MG 24 hr capsule; Take 1 capsule (15 mg) by mouth every morning    Dysmenorrhea  Controlled, continue.  - norgestimate-ethinyl estradiol (ORTHO-CYCLEN) 0.25-35 MG-MCG tablet; Take by mouth active pills only as directed    Need for vaccination  - TDAP Vaccine    Screening for tuberculosis  - Quantiferon TB Gold Plus; Future  - Quantiferon TB Gold Plus    Screening for STDs (sexually transmitted diseases)  - Treponema Abs w Reflex to RPR and Titer; Future  - Treponema Abs w Reflex to RPR and Titer      10 minutes spent by me on the date of the encounter doing chart review, history and exam, documentation and further activities per the note        See Patient Instructions    Juan Mueller is a 24 year old, presenting for the following health issues:  Medication Refill (Adderall and Birth control pills/) and Blood Draw (Need labs for TB gold and Syphilis for school traveling abroad. Also needs TDaP )        4/17/2024     3:35 PM   Additional Questions   Roomed by Dana SCHROEDER MA     History of Present Illness       Reason for visit:  Health certificates sign and medication refill    She eats 2-3 servings of fruits and vegetables daily.She consumes 5 sweetened beverage(s) daily.She exercises with enough effort to increase her heart rate 60 or more minutes per day.  She exercises with enough effort to increase her heart rate 5 days per week.   She is taking medications regularly.    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    Would like to get 120 day supply of medications if able.        Objective    BP 94/61 (BP Location: Right arm, Patient Position: Sitting, Cuff Size: Adult Regular)   Pulse 82   Temp 97.4  F (36.3  C) (Temporal)   Resp 16   Ht 1.657 m (5' 5.25\")   Wt 58.2 kg (128 lb 3.2 oz)   LMP 04/02/2024 (Exact Date)   SpO2 98%   BMI 21.17 kg/m    Body mass index is 21.17 kg/m .  Physical Exam   GENERAL: alert and no distress  PSYCH: mentation appears normal, affect normal/bright          Signed Electronically by: Dawna Vazquez MD    "

## 2024-04-18 LAB — T PALLIDUM AB SER QL: NONREACTIVE

## 2024-04-19 LAB
GAMMA INTERFERON BACKGROUND BLD IA-ACNC: 0.04 IU/ML
M TB IFN-G BLD-IMP: NEGATIVE
M TB IFN-G CD4+ BCKGRND COR BLD-ACNC: 9.96 IU/ML
MITOGEN IGNF BCKGRD COR BLD-ACNC: 0.01 IU/ML
MITOGEN IGNF BCKGRD COR BLD-ACNC: 0.03 IU/ML
QUANTIFERON MITOGEN: 10 IU/ML
QUANTIFERON NIL TUBE: 0.04 IU/ML
QUANTIFERON TB1 TUBE: 0.05 IU/ML
QUANTIFERON TB2 TUBE: 0.07

## 2024-04-25 DIAGNOSIS — F90.9 ATTENTION DEFICIT HYPERACTIVITY DISORDER (ADHD), UNSPECIFIED ADHD TYPE: ICD-10-CM

## 2024-04-25 RX ORDER — DEXTROAMPHETAMINE SACCHARATE, AMPHETAMINE ASPARTATE, DEXTROAMPHETAMINE SULFATE AND AMPHETAMINE SULFATE 3.75; 3.75; 3.75; 3.75 MG/1; MG/1; MG/1; MG/1
15 TABLET ORAL DAILY
Qty: 120 TABLET | Refills: 0 | Status: SHIPPED | OUTPATIENT
Start: 2024-05-23 | End: 2024-08-19

## 2024-04-25 RX ORDER — DEXTROAMPHETAMINE SACCHARATE, AMPHETAMINE ASPARTATE MONOHYDRATE, DEXTROAMPHETAMINE SULFATE AND AMPHETAMINE SULFATE 3.75; 3.75; 3.75; 3.75 MG/1; MG/1; MG/1; MG/1
15 CAPSULE, EXTENDED RELEASE ORAL EVERY MORNING
Qty: 30 CAPSULE | Refills: 0 | Status: SHIPPED | OUTPATIENT
Start: 2024-04-25 | End: 2024-05-07

## 2024-04-25 NOTE — TELEPHONE ENCOUNTER
Patient calling and states [facility] can not fill Adderall for her.  States they told her she needs a new RX sent.  She said it needed to be sent without the dispense on it.  Unsure what she needed fixed.  She went back into [facility].  Pharmacist states they need new RX that has THOMPSON so can change to generic.  T'd up.  I unchecked THOMPSON.  Please advise.  Pam Recio RN

## 2024-04-25 NOTE — TELEPHONE ENCOUNTER
Mom requesting Dr. Laura Vazquez write the next prescription for generic Adderall for the original 120 tablets with permission to fill one week early because mom has to mail it to patient at [employer] [facility] [employer] [facility] in the [facility].    Order pended for provider review.

## 2024-05-01 ENCOUNTER — MYC MEDICAL ADVICE (OUTPATIENT)
Dept: FAMILY MEDICINE | Facility: CLINIC | Age: 24
End: 2024-05-01
Payer: COMMERCIAL

## 2024-05-01 DIAGNOSIS — F90.9 ATTENTION DEFICIT HYPERACTIVITY DISORDER (ADHD), UNSPECIFIED ADHD TYPE: ICD-10-CM

## 2024-05-02 ENCOUNTER — MYC MEDICAL ADVICE (OUTPATIENT)
Dept: FAMILY MEDICINE | Facility: CLINIC | Age: 24
End: 2024-05-02
Payer: COMMERCIAL

## 2024-05-02 NOTE — TELEPHONE ENCOUNTER
Patient Quality Outreach    Patient is due for the following:   Cervical Cancer Screening - PAP Needed    Next Steps:   No follow up needed at this time.    Type of outreach:    Sent [employer] message.    Next Steps:  Reach out within 90 days via [employer].    Max number of attempts reached: No. Will try again in 90 days if patient still on fail list.    Questions for provider review:    None           Mariia Dudley CMA  Chart routed to Care Team.

## 2024-05-02 NOTE — TELEPHONE ENCOUNTER
Patient Quality Outreach    Patient is due for the following:   Chlamydia Screening    Next Steps:   No follow up needed at this time.    Type of outreach:    Sent IPG message.    Next Steps:  Reach out within 90 days via Veaconhart.    Max number of attempts reached: No. Will try again in 90 days if patient still on fail list.    Questions for provider review:    None           Mariia Dudley, CHUNG  Chart routed to Care Team.

## 2024-05-07 NOTE — TELEPHONE ENCOUNTER
"Dr. Laura Vazquez,    Pt's mother calling (CTC on file) to inquire about Adderall script for school. She states the dispense amount needs to be changed to 90 tablets for insurance coverage. She also asks that the script be written for Adderall XR 15 mg (current script for IR).     Per chart review 4/17/24:  \"Attention deficit hyperactivity disorder (ADHD), unspecified ADHD type  Will order Rx for 120 days, if not approved patient will let us know and a different quantity can be dispensed.  - ADDERALL XR 15 MG 24 hr capsule; Take 1 capsule (15 mg) by mouth every morning\"    Current order:    amphetamine-dextroamphetamine (ADDERALL) 15 MG tablet (Future) Take 1 tablet (15 mg) by mouth daily 120 tablet 0 ordered 05/23/2024 -- -- OK to fill early. Fill as generic.     Pt's mother states she may need a different pharmacy, but for now [facility] [address] is fine. She also states she may need a form filled out for her daughter's school, but she will call back with more information if needed.     Additionally, can the pharmacy fill 90 day supply at once?     Pended med, please review (med details and note to pharmacy for early fill and generic) and advise, thank you!  Flex Barron RN on 5/7/2024 at 4:29 PM    "

## 2024-05-07 NOTE — TELEPHONE ENCOUNTER
Received call from patient's mother, CTC on file, patient is needing accomodation form for school. Patient started school yesterday. Please review form below and send.     Adrien TANG RN 5/7/2024 at 9:28 AM

## 2024-05-08 RX ORDER — DEXTROAMPHETAMINE SACCHARATE, AMPHETAMINE ASPARTATE MONOHYDRATE, DEXTROAMPHETAMINE SULFATE AND AMPHETAMINE SULFATE 3.75; 3.75; 3.75; 3.75 MG/1; MG/1; MG/1; MG/1
15 CAPSULE, EXTENDED RELEASE ORAL EVERY MORNING
Qty: 90 CAPSULE | Refills: 0 | Status: SHIPPED | OUTPATIENT
Start: 2024-05-23 | End: 2024-08-19

## 2024-07-17 ENCOUNTER — PATIENT OUTREACH (OUTPATIENT)
Dept: CARE COORDINATION | Facility: CLINIC | Age: 24
End: 2024-07-17
Payer: COMMERCIAL

## 2024-07-23 ENCOUNTER — TELEPHONE (OUTPATIENT)
Dept: FAMILY MEDICINE | Facility: CLINIC | Age: 24
End: 2024-07-23
Payer: COMMERCIAL

## 2024-07-23 NOTE — TELEPHONE ENCOUNTER
Patient needs to be seen for evaluation down there.  She can request a referral to GI when she is in [place] and can do an e-visit, but still should be seen before then. It is not likely that she will be able to see GI during her requested time frame, as they are typically booked out for months.   No

## 2024-07-23 NOTE — TELEPHONE ENCOUNTER
Mom calls (CTC on file) requesting a referral to GI. Since moving to the [facility], patient has been experiencing intermittent bloody stools, abdominal pain and some recent vomiting.    Mom was advised that patient should be seen there urgently for symptoms. Mom will relay message to patient. Mom would still like request for GI referral sent to PCP. Pt will be home in August from 8/19-8/30 and they would like to try and get in with GI at that time.    Advised Mom that this would require an appointment prior to referral being placed but she would like message sent to PCP.    Olga Lidia Suggs RN on 7/23/2024 at 1:00 PM

## 2024-07-24 NOTE — TELEPHONE ENCOUNTER
Message #1 left to return call     Gissel Ledesma Registered Nurse  [facility]

## 2024-07-24 NOTE — TELEPHONE ENCOUNTER
Mom calling back.  Advised of below.  Mom states provider that was at school has left and they are trying to find a new one.  Again discussed unable to do visit of any type unless patient is in [place].  Requested response be sent in [employer] message.  Sent provider response in [employer] message.  Pam Recio RN

## 2024-07-25 ENCOUNTER — TELEPHONE (OUTPATIENT)
Dept: FAMILY MEDICINE | Facility: CLINIC | Age: 24
End: 2024-07-25
Payer: COMMERCIAL

## 2024-07-25 NOTE — TELEPHONE ENCOUNTER
Patient Quality Outreach    Patient is due for the following:   Chlamydia Screening    Next Steps:   Patient has upcoming appointment, these items will be addressed at that time.    Type of outreach:    Chart review performed, no outreach needed.    Next Steps:  Reach out within 90 days via PlayPhonet.    Max number of attempts reached: No. Will try again in 90 days if patient still on fail list.    Questions for provider review:    None           Mariia Dudley CMA  Chart routed to Care Team.

## 2024-07-31 ENCOUNTER — PATIENT OUTREACH (OUTPATIENT)
Dept: CARE COORDINATION | Facility: CLINIC | Age: 24
End: 2024-07-31
Payer: COMMERCIAL

## 2024-08-19 ENCOUNTER — VIRTUAL VISIT (OUTPATIENT)
Dept: FAMILY MEDICINE | Facility: CLINIC | Age: 24
End: 2024-08-19
Payer: COMMERCIAL

## 2024-08-19 DIAGNOSIS — F90.9 ATTENTION DEFICIT HYPERACTIVITY DISORDER (ADHD), UNSPECIFIED ADHD TYPE: ICD-10-CM

## 2024-08-19 DIAGNOSIS — Z11.8 SPECIAL SCREENING EXAMINATION FOR CHLAMYDIAL DISEASE: Primary | ICD-10-CM

## 2024-08-19 PROCEDURE — 99213 OFFICE O/P EST LOW 20 MIN: CPT | Mod: 95 | Performed by: FAMILY MEDICINE

## 2024-08-19 PROCEDURE — G2211 COMPLEX E/M VISIT ADD ON: HCPCS | Mod: 95 | Performed by: FAMILY MEDICINE

## 2024-08-19 RX ORDER — DEXTROAMPHETAMINE SACCHARATE, AMPHETAMINE ASPARTATE MONOHYDRATE, DEXTROAMPHETAMINE SULFATE AND AMPHETAMINE SULFATE 3.75; 3.75; 3.75; 3.75 MG/1; MG/1; MG/1; MG/1
15 CAPSULE, EXTENDED RELEASE ORAL EVERY MORNING
Qty: 90 CAPSULE | Refills: 0 | Status: SHIPPED | OUTPATIENT
Start: 2024-08-19

## 2024-08-19 NOTE — PROGRESS NOTES
Ami is a 24 year old who is being evaluated via a billable video visit.    How would you like to obtain your AVS? MyChart  If the video visit is dropped, the invitation should be resent by: Text to cell phone: [phone]  Will anyone else be joining your video visit? No      Assessment & Plan     Attention deficit hyperactivity disorder (ADHD), unspecified ADHD type  Doing well on current dose, refill today.  Follow upin 4-6 months or sooner as needed.  - amphetamine-dextroamphetamine (ADDERALL XR) 15 MG 24 hr capsule; Take 1 capsule (15 mg) by mouth every morning    Special screening examination for chlamydial disease  - Chlamydia trachomatis PCR; Future      The longitudinal plan of care for the diagnosis(es)/condition(s) as documented were addressed during this visit. Due to the added complexity in care, I will continue to support Ami in the subsequent management and with ongoing continuity of care.      See Patient Instructions    Subjective   Ami is a 24 year old, presenting for the following health issues:  Contraception (refill) and A.D.H.D (Adderall refill)        8/19/2024     4:54 PM   Additional Questions   Roomed by Dana SCHROEDER MA     History of Present Illness       Reason for visit:  Medication refill    She eats 2-3 servings of fruits and vegetables daily.She consumes 1 sweetened beverage(s) daily.She exercises with enough effort to increase her heart rate 60 or more minutes per day.  She exercises with enough effort to increase her heart rate 4 days per week.   She is taking medications regularly.       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  She has an OV tomorrow with another provider to get this evaluated.  Giardia is common  "there but she is not thinking this is her situation, possibly diet.  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  Trying to get her Adderall refilled, also OCP.    Medication Followup of OCP  Taking Medication as prescribed: yes  Side Effects:  None  Medication Helping Symptoms:  yes        Objective    Vitals - Patient Reported  Weight (Patient Reported): 53.5 kg (118 lb)  Height (Patient Reported): 165.1 cm (5' 5\")  BMI (Based on Pt Reported Ht/Wt): 19.64    Physical Exam   GENERAL: alert and no distress  EYES: Eyes grossly normal to inspection.  No discharge or erythema, or obvious scleral/conjunctival abnormalities.  RESP: No audible wheeze, cough, or visible cyanosis.    SKIN: Visible skin clear. No significant rash, abnormal pigmentation or lesions.  NEURO: Cranial nerves grossly intact.  Mentation and speech appropriate for age.  PSYCH: Appropriate affect, tone, and pace of words        Video-Visit Details    Type of service:  Video Visit   Originating Location (pt. Location): Home    Distant Location (provider location):  On-site  Platform used for Video Visit: Michael  Signed Electronically by: Dawna Vazquez MD    "

## 2024-08-20 ENCOUNTER — OFFICE VISIT (OUTPATIENT)
Dept: FAMILY MEDICINE | Facility: CLINIC | Age: 24
End: 2024-08-20
Payer: COMMERCIAL

## 2024-08-20 VITALS
OXYGEN SATURATION: 99 % | BODY MASS INDEX: 19.41 KG/M2 | RESPIRATION RATE: 16 BRPM | HEART RATE: 87 BPM | WEIGHT: 116.5 LBS | DIASTOLIC BLOOD PRESSURE: 72 MMHG | TEMPERATURE: 97.4 F | SYSTOLIC BLOOD PRESSURE: 102 MMHG | HEIGHT: 65 IN

## 2024-08-20 DIAGNOSIS — K92.1 HEMATOCHEZIA: Primary | ICD-10-CM

## 2024-08-20 PROCEDURE — 99213 OFFICE O/P EST LOW 20 MIN: CPT | Performed by: GENERAL PRACTICE

## 2024-08-20 ASSESSMENT — PAIN SCALES - GENERAL: PAINLEVEL: NO PAIN (0)

## 2024-08-20 NOTE — PROGRESS NOTES
"  Assessment & Plan     Hematochezia  LUIS with ? Small lumps in the vault, no bleeding  Has GI follow-up this week              HPI:       Moved to [facility] 4 months ago, had diarrhea for 2 weeks straight and resolved. Noticed some blood around the stools and some on the wipes. Also has some cramps.    Had some GI issues while she was younger , had miralax for 2 years. Saw pediatric GI and was told to have a sensitive stomach.   BM every day, hard caliber.  Has been having blood around the stools.     Been using prep H.    Lost 10 lbs from 4 months ago, more like a diet change.    A vet student, tired all the time.    Has GI follow-up 8/23        Subjective   Ami is a 24 year old, presenting for the following health issues:  Gastrointestinal Problem (Blood in stool, and cramps ) and Hemorrhoids        8/20/2024     2:16 PM   Additional Questions   Roomed by Krystal VOGT MA   Accompanied by self         8/20/2024     2:16 PM   Patient Reported Additional Medications   Patient reports taking the following new medications none              Hemorrhoids  Onset/Duration: 3 moths since moved to [facility]  Description:   Meenu-anal lump: No  Pain: YES  Itching: No  Accompanying Signs & Symptoms:  Blood in stool: YES  Changes in stool pattern: harder stool and constipation   History:   Any previous GI studies done:none  Family History of colon cancer: YES  Precipitating factors:   None  Alleviating factors:  None  Therapies tried and outcome: preparation H            Review of Systems  Constitutional, HEENT, cardiovascular, pulmonary, gi and gu systems are negative, except as otherwise noted.      Objective    /72 (BP Location: Right arm, Patient Position: Sitting, Cuff Size: Adult Large)   Pulse 87   Temp 97.4  F (36.3  C) (Oral)   Resp 16   Ht 1.651 m (5' 5\")   Wt 52.8 kg (116 lb 8 oz)   LMP 08/09/2024   SpO2 99%   BMI 19.39 kg/m    Body mass index is 19.39 kg/m .  Physical " Exam  Abdominal:      Comments: LUIS: No external hemorrhoids, several small lumps in the vault. No bleeding.                     Signed Electronically by: Kathy Lara MD

## 2024-08-21 ENCOUNTER — TRANSFERRED RECORDS (OUTPATIENT)
Dept: HEALTH INFORMATION MANAGEMENT | Facility: CLINIC | Age: 24
End: 2024-08-21
Payer: COMMERCIAL

## 2024-11-09 ENCOUNTER — HEALTH MAINTENANCE LETTER (OUTPATIENT)
Age: 24
End: 2024-11-09

## 2024-12-19 ENCOUNTER — MYC REFILL (OUTPATIENT)
Dept: FAMILY MEDICINE | Facility: CLINIC | Age: 24
End: 2024-12-19
Payer: COMMERCIAL

## 2024-12-19 DIAGNOSIS — F90.9 ATTENTION DEFICIT HYPERACTIVITY DISORDER (ADHD), UNSPECIFIED ADHD TYPE: ICD-10-CM

## 2024-12-19 RX ORDER — DEXTROAMPHETAMINE SACCHARATE, AMPHETAMINE ASPARTATE MONOHYDRATE, DEXTROAMPHETAMINE SULFATE AND AMPHETAMINE SULFATE 3.75; 3.75; 3.75; 3.75 MG/1; MG/1; MG/1; MG/1
15 CAPSULE, EXTENDED RELEASE ORAL EVERY MORNING
Qty: 90 CAPSULE | Refills: 0 | Status: SHIPPED | OUTPATIENT
Start: 2024-12-19

## 2025-02-12 ENCOUNTER — PATIENT OUTREACH (OUTPATIENT)
Dept: CARE COORDINATION | Facility: CLINIC | Age: 25
End: 2025-02-12
Payer: COMMERCIAL